# Patient Record
Sex: FEMALE | Race: WHITE | Employment: OTHER | URBAN - NONMETROPOLITAN AREA
[De-identification: names, ages, dates, MRNs, and addresses within clinical notes are randomized per-mention and may not be internally consistent; named-entity substitution may affect disease eponyms.]

---

## 2018-03-07 ENCOUNTER — APPOINTMENT (OUTPATIENT)
Dept: ULTRASOUND IMAGING | Age: 83
DRG: 070 | End: 2018-03-07
Attending: INTERNAL MEDICINE
Payer: MEDICARE

## 2018-03-07 ENCOUNTER — APPOINTMENT (OUTPATIENT)
Dept: INTERVENTIONAL RADIOLOGY/VASCULAR | Age: 83
DRG: 070 | End: 2018-03-07
Attending: INTERNAL MEDICINE
Payer: MEDICARE

## 2018-03-07 ENCOUNTER — HOSPITAL ENCOUNTER (INPATIENT)
Age: 83
LOS: 3 days | Discharge: NURSING, REHAB OR LONG-TERM CARE | DRG: 070 | End: 2018-03-10
Attending: INTERNAL MEDICINE | Admitting: INTERNAL MEDICINE
Payer: MEDICARE

## 2018-03-07 ENCOUNTER — APPOINTMENT (OUTPATIENT)
Dept: MRI IMAGING | Age: 83
DRG: 070 | End: 2018-03-07
Attending: INTERNAL MEDICINE
Payer: MEDICARE

## 2018-03-07 PROBLEM — R74.01 TRANSAMINITIS: Status: ACTIVE | Noted: 2018-03-07

## 2018-03-07 PROBLEM — E86.0 DEHYDRATION: Status: ACTIVE | Noted: 2018-03-07

## 2018-03-07 PROBLEM — G93.40 ACUTE ENCEPHALOPATHY: Status: ACTIVE | Noted: 2018-03-07

## 2018-03-07 LAB
ALBUMIN SERPL-MCNC: 3 G/DL (ref 3.5–5.1)
ALP BLD-CCNC: 215 U/L (ref 38–126)
ALT SERPL-CCNC: 506 U/L (ref 11–66)
ANION GAP SERPL CALCULATED.3IONS-SCNC: 14 MEQ/L (ref 8–16)
ANISOCYTOSIS: ABNORMAL
AST SERPL-CCNC: 406 U/L (ref 5–40)
BASOPHILS # BLD: 0.1 %
BASOPHILS ABSOLUTE: 0 THOU/MM3 (ref 0–0.1)
BILIRUB SERPL-MCNC: 2 MG/DL (ref 0.3–1.2)
BUN BLDV-MCNC: 21 MG/DL (ref 7–22)
CALCIUM SERPL-MCNC: 8.4 MG/DL (ref 8.5–10.5)
CHLORIDE BLD-SCNC: 107 MEQ/L (ref 98–111)
CO2: 21 MEQ/L (ref 23–33)
CREAT SERPL-MCNC: 0.7 MG/DL (ref 0.4–1.2)
EOSINOPHIL # BLD: 0.2 %
EOSINOPHILS ABSOLUTE: 0 THOU/MM3 (ref 0–0.4)
GFR SERPL CREATININE-BSD FRML MDRD: 78 ML/MIN/1.73M2
GLUCOSE BLD-MCNC: 98 MG/DL (ref 70–108)
HCT VFR BLD CALC: 34.4 % (ref 37–47)
HEMOGLOBIN: 11.2 GM/DL (ref 12–16)
LYMPHOCYTES # BLD: 6.7 %
LYMPHOCYTES ABSOLUTE: 0.6 THOU/MM3 (ref 1–4.8)
MAGNESIUM: 2.2 MG/DL (ref 1.6–2.4)
MCH RBC QN AUTO: 29.1 PG (ref 27–31)
MCHC RBC AUTO-ENTMCNC: 32.5 GM/DL (ref 33–37)
MCV RBC AUTO: 89.4 FL (ref 81–99)
MONOCYTES # BLD: 7.1 %
MONOCYTES ABSOLUTE: 0.6 THOU/MM3 (ref 0.4–1.3)
NUCLEATED RED BLOOD CELLS: 0 /100 WBC
PDW BLD-RTO: 16 % (ref 11.5–14.5)
PLATELET # BLD: 131 THOU/MM3 (ref 130–400)
PMV BLD AUTO: 8.9 FL (ref 7.4–10.4)
POTASSIUM SERPL-SCNC: 3.9 MEQ/L (ref 3.5–5.2)
RBC # BLD: 3.85 MILL/MM3 (ref 4.2–5.4)
SEG NEUTROPHILS: 85.9 %
SEGMENTED NEUTROPHILS ABSOLUTE COUNT: 7.3 THOU/MM3 (ref 1.8–7.7)
SODIUM BLD-SCNC: 142 MEQ/L (ref 135–145)
TOTAL CK: 251 U/L (ref 30–135)
TOTAL PROTEIN: 5 G/DL (ref 6.1–8)
WBC # BLD: 8.5 THOU/MM3 (ref 4.8–10.8)

## 2018-03-07 PROCEDURE — 2580000003 HC RX 258: Performed by: INTERNAL MEDICINE

## 2018-03-07 PROCEDURE — 36415 COLL VENOUS BLD VENIPUNCTURE: CPT

## 2018-03-07 PROCEDURE — 85025 COMPLETE CBC W/AUTO DIFF WBC: CPT

## 2018-03-07 PROCEDURE — 70547 MR ANGIOGRAPHY NECK W/O DYE: CPT

## 2018-03-07 PROCEDURE — 93880 EXTRACRANIAL BILAT STUDY: CPT

## 2018-03-07 PROCEDURE — 93970 EXTREMITY STUDY: CPT

## 2018-03-07 PROCEDURE — 80053 COMPREHEN METABOLIC PANEL: CPT

## 2018-03-07 PROCEDURE — 76705 ECHO EXAM OF ABDOMEN: CPT

## 2018-03-07 PROCEDURE — 70551 MRI BRAIN STEM W/O DYE: CPT

## 2018-03-07 PROCEDURE — 70544 MR ANGIOGRAPHY HEAD W/O DYE: CPT

## 2018-03-07 PROCEDURE — 2140000000 HC CCU INTERMEDIATE R&B

## 2018-03-07 PROCEDURE — 99223 1ST HOSP IP/OBS HIGH 75: CPT | Performed by: INTERNAL MEDICINE

## 2018-03-07 PROCEDURE — 83735 ASSAY OF MAGNESIUM: CPT

## 2018-03-07 PROCEDURE — 82550 ASSAY OF CK (CPK): CPT

## 2018-03-07 RX ORDER — RANITIDINE 150 MG/1
150 TABLET ORAL 2 TIMES DAILY
COMMUNITY

## 2018-03-07 RX ORDER — ASPIRIN 81 MG/1
81 TABLET ORAL DAILY
COMMUNITY

## 2018-03-07 RX ORDER — FEXOFENADINE HYDROCHLORIDE 60 MG/1
60 TABLET, FILM COATED ORAL DAILY
COMMUNITY

## 2018-03-07 RX ORDER — SODIUM CHLORIDE 0.9 % (FLUSH) 0.9 %
10 SYRINGE (ML) INJECTION PRN
Status: DISCONTINUED | OUTPATIENT
Start: 2018-03-07 | End: 2018-03-10 | Stop reason: HOSPADM

## 2018-03-07 RX ORDER — HYDROCORTISONE ACETATE 0.5 %
1 CREAM (GRAM) TOPICAL DAILY
COMMUNITY

## 2018-03-07 RX ORDER — SODIUM CHLORIDE 0.9 % (FLUSH) 0.9 %
10 SYRINGE (ML) INJECTION EVERY 12 HOURS SCHEDULED
Status: DISCONTINUED | OUTPATIENT
Start: 2018-03-07 | End: 2018-03-10 | Stop reason: HOSPADM

## 2018-03-07 RX ORDER — CLOTRIMAZOLE AND BETAMETHASONE DIPROPIONATE 10; .64 MG/G; MG/G
CREAM TOPICAL 2 TIMES DAILY
COMMUNITY

## 2018-03-07 RX ORDER — ONDANSETRON 2 MG/ML
4 INJECTION INTRAMUSCULAR; INTRAVENOUS EVERY 6 HOURS PRN
Status: DISCONTINUED | OUTPATIENT
Start: 2018-03-07 | End: 2018-03-10 | Stop reason: HOSPADM

## 2018-03-07 RX ORDER — OMEPRAZOLE 20 MG/1
20 CAPSULE, DELAYED RELEASE ORAL 2 TIMES DAILY
COMMUNITY

## 2018-03-07 RX ORDER — ACETAMINOPHEN 325 MG/1
650 TABLET ORAL EVERY 4 HOURS PRN
Status: DISCONTINUED | OUTPATIENT
Start: 2018-03-07 | End: 2018-03-07

## 2018-03-07 RX ORDER — HYDROCODONE BITARTRATE AND ACETAMINOPHEN 5; 325 MG/1; MG/1
1 TABLET ORAL EVERY 6 HOURS PRN
COMMUNITY

## 2018-03-07 RX ORDER — M-VIT,TX,IRON,MINS/CALC/FOLIC 27MG-0.4MG
1 TABLET ORAL DAILY
COMMUNITY

## 2018-03-07 RX ORDER — FLUTICASONE PROPIONATE 50 MCG
1 SPRAY, SUSPENSION (ML) NASAL DAILY PRN
COMMUNITY

## 2018-03-07 RX ORDER — AMITRIPTYLINE HYDROCHLORIDE 10 MG/1
10 TABLET, FILM COATED ORAL NIGHTLY
COMMUNITY

## 2018-03-07 RX ORDER — ACETAMINOPHEN 325 MG/1
650 TABLET ORAL EVERY 4 HOURS PRN
COMMUNITY

## 2018-03-07 RX ORDER — CYCLOBENZAPRINE HCL 10 MG
10 TABLET ORAL EVERY 8 HOURS PRN
COMMUNITY

## 2018-03-07 RX ORDER — CLONAZEPAM 0.5 MG/1
0.25 TABLET ORAL NIGHTLY
COMMUNITY

## 2018-03-07 RX ORDER — FUROSEMIDE 20 MG/1
10 TABLET ORAL DAILY
COMMUNITY

## 2018-03-07 RX ORDER — DOCUSATE SODIUM 100 MG/1
100 CAPSULE, LIQUID FILLED ORAL DAILY PRN
COMMUNITY

## 2018-03-07 RX ORDER — DEXTROSE AND SODIUM CHLORIDE 5; .45 G/100ML; G/100ML
INJECTION, SOLUTION INTRAVENOUS CONTINUOUS
Status: DISCONTINUED | OUTPATIENT
Start: 2018-03-07 | End: 2018-03-10

## 2018-03-07 RX ADMIN — DEXTROSE AND SODIUM CHLORIDE: 5; 450 INJECTION, SOLUTION INTRAVENOUS at 22:55

## 2018-03-08 PROBLEM — R55 SYNCOPE AND COLLAPSE: Status: ACTIVE | Noted: 2018-03-08

## 2018-03-08 LAB
ALBUMIN SERPL-MCNC: 2.8 G/DL (ref 3.5–5.1)
ALP BLD-CCNC: 191 U/L (ref 38–126)
ALT SERPL-CCNC: 389 U/L (ref 11–66)
AMMONIA: 49 UMOL/L (ref 11–60)
ANION GAP SERPL CALCULATED.3IONS-SCNC: 12 MEQ/L (ref 8–16)
AST SERPL-CCNC: 258 U/L (ref 5–40)
BASE EXCESS (CALCULATED): -2.1 MMOL/L (ref -2.5–2.5)
BILIRUB SERPL-MCNC: 1.3 MG/DL (ref 0.3–1.2)
BUN BLDV-MCNC: 22 MG/DL (ref 7–22)
CALCIUM SERPL-MCNC: 8.4 MG/DL (ref 8.5–10.5)
CHLORIDE BLD-SCNC: 107 MEQ/L (ref 98–111)
CO2: 22 MEQ/L (ref 23–33)
COLLECTED BY:: NORMAL
CREAT SERPL-MCNC: 0.7 MG/DL (ref 0.4–1.2)
DEVICE: NORMAL
FOLATE: > 20 NG/ML (ref 4.8–24.2)
GFR SERPL CREATININE-BSD FRML MDRD: 78 ML/MIN/1.73M2
GLUCOSE BLD-MCNC: 134 MG/DL (ref 70–108)
HCO3: 23 MMOL/L (ref 23–28)
HCT VFR BLD CALC: 32 % (ref 37–47)
HEMOGLOBIN: 10.4 GM/DL (ref 12–16)
IFIO2: 4
INR BLD: 1.18 (ref 0.85–1.13)
MCH RBC QN AUTO: 29.6 PG (ref 27–31)
MCHC RBC AUTO-ENTMCNC: 32.5 GM/DL (ref 33–37)
MCV RBC AUTO: 91.1 FL (ref 81–99)
O2 SATURATION: 96 %
PCO2: 40 MMHG (ref 35–45)
PDW BLD-RTO: 16.4 % (ref 11.5–14.5)
PH BLOOD GAS: 7.37 (ref 7.35–7.45)
PLATELET # BLD: 111 THOU/MM3 (ref 130–400)
PMV BLD AUTO: 9.1 FL (ref 7.4–10.4)
PO2: 86 MMHG (ref 71–104)
POTASSIUM REFLEX MAGNESIUM: 3.6 MEQ/L (ref 3.5–5.2)
RBC # BLD: 3.52 MILL/MM3 (ref 4.2–5.4)
SODIUM BLD-SCNC: 141 MEQ/L (ref 135–145)
SOURCE, BLOOD GAS: NORMAL
TOTAL CK: 163 U/L (ref 30–135)
TOTAL PROTEIN: 4.8 G/DL (ref 6.1–8)
VITAMIN B-12: > 2000 PG/ML (ref 211–911)
WBC # BLD: 6.9 THOU/MM3 (ref 4.8–10.8)

## 2018-03-08 PROCEDURE — 2140000000 HC CCU INTERMEDIATE R&B

## 2018-03-08 PROCEDURE — 95819 EEG AWAKE AND ASLEEP: CPT

## 2018-03-08 PROCEDURE — 82607 VITAMIN B-12: CPT

## 2018-03-08 PROCEDURE — 36600 WITHDRAWAL OF ARTERIAL BLOOD: CPT

## 2018-03-08 PROCEDURE — 36415 COLL VENOUS BLD VENIPUNCTURE: CPT

## 2018-03-08 PROCEDURE — 82550 ASSAY OF CK (CPK): CPT

## 2018-03-08 PROCEDURE — 92610 EVALUATE SWALLOWING FUNCTION: CPT

## 2018-03-08 PROCEDURE — G8979 MOBILITY GOAL STATUS: HCPCS

## 2018-03-08 PROCEDURE — 2500000003 HC RX 250 WO HCPCS: Performed by: INTERNAL MEDICINE

## 2018-03-08 PROCEDURE — G8978 MOBILITY CURRENT STATUS: HCPCS

## 2018-03-08 PROCEDURE — 6370000000 HC RX 637 (ALT 250 FOR IP): Performed by: INTERNAL MEDICINE

## 2018-03-08 PROCEDURE — 2580000003 HC RX 258: Performed by: INTERNAL MEDICINE

## 2018-03-08 PROCEDURE — 6360000002 HC RX W HCPCS: Performed by: HOSPITALIST

## 2018-03-08 PROCEDURE — 85027 COMPLETE CBC AUTOMATED: CPT

## 2018-03-08 PROCEDURE — 80053 COMPREHEN METABOLIC PANEL: CPT

## 2018-03-08 PROCEDURE — 99222 1ST HOSP IP/OBS MODERATE 55: CPT | Performed by: PSYCHIATRY & NEUROLOGY

## 2018-03-08 PROCEDURE — 82746 ASSAY OF FOLIC ACID SERUM: CPT

## 2018-03-08 PROCEDURE — 97162 PT EVAL MOD COMPLEX 30 MIN: CPT

## 2018-03-08 PROCEDURE — 97110 THERAPEUTIC EXERCISES: CPT

## 2018-03-08 PROCEDURE — 82803 BLOOD GASES ANY COMBINATION: CPT

## 2018-03-08 PROCEDURE — 6360000002 HC RX W HCPCS: Performed by: INTERNAL MEDICINE

## 2018-03-08 PROCEDURE — 87086 URINE CULTURE/COLONY COUNT: CPT

## 2018-03-08 PROCEDURE — 2700000000 HC OXYGEN THERAPY PER DAY

## 2018-03-08 PROCEDURE — 97530 THERAPEUTIC ACTIVITIES: CPT

## 2018-03-08 PROCEDURE — 87040 BLOOD CULTURE FOR BACTERIA: CPT

## 2018-03-08 PROCEDURE — 99232 SBSQ HOSP IP/OBS MODERATE 35: CPT | Performed by: HOSPITALIST

## 2018-03-08 PROCEDURE — 82140 ASSAY OF AMMONIA: CPT

## 2018-03-08 PROCEDURE — 85610 PROTHROMBIN TIME: CPT

## 2018-03-08 RX ORDER — HEPARIN SODIUM 5000 [USP'U]/ML
5000 INJECTION, SOLUTION INTRAVENOUS; SUBCUTANEOUS 2 TIMES DAILY
Status: DISCONTINUED | OUTPATIENT
Start: 2018-03-08 | End: 2018-03-10 | Stop reason: HOSPADM

## 2018-03-08 RX ORDER — BUSPIRONE HYDROCHLORIDE 5 MG/1
5 TABLET ORAL 3 TIMES DAILY
Status: DISCONTINUED | OUTPATIENT
Start: 2018-03-08 | End: 2018-03-10 | Stop reason: HOSPADM

## 2018-03-08 RX ORDER — LACTULOSE 10 G/15ML
20 SOLUTION ORAL 2 TIMES DAILY
Status: DISCONTINUED | OUTPATIENT
Start: 2018-03-08 | End: 2018-03-10

## 2018-03-08 RX ADMIN — DEXTROSE AND SODIUM CHLORIDE: 5; 450 INJECTION, SOLUTION INTRAVENOUS at 11:06

## 2018-03-08 RX ADMIN — LACTULOSE 20 G: 20 SOLUTION ORAL at 16:58

## 2018-03-08 RX ADMIN — Medication 10 ML: at 21:03

## 2018-03-08 RX ADMIN — PIPERACILLIN SODIUM,TAZOBACTAM SODIUM 3.38 G: 3; .375 INJECTION, POWDER, FOR SOLUTION INTRAVENOUS at 11:06

## 2018-03-08 RX ADMIN — HEPARIN SODIUM 5000 UNITS: 5000 INJECTION, SOLUTION INTRAVENOUS; SUBCUTANEOUS at 13:18

## 2018-03-08 RX ADMIN — PIPERACILLIN SODIUM,TAZOBACTAM SODIUM 3.38 G: 3; .375 INJECTION, POWDER, FOR SOLUTION INTRAVENOUS at 16:58

## 2018-03-08 RX ADMIN — FOLIC ACID: 5 INJECTION, SOLUTION INTRAMUSCULAR; INTRAVENOUS; SUBCUTANEOUS at 22:26

## 2018-03-08 RX ADMIN — PIPERACILLIN SODIUM,TAZOBACTAM SODIUM 3.38 G: 3; .375 INJECTION, POWDER, FOR SOLUTION INTRAVENOUS at 00:54

## 2018-03-08 RX ADMIN — HEPARIN SODIUM 5000 UNITS: 5000 INJECTION, SOLUTION INTRAVENOUS; SUBCUTANEOUS at 21:02

## 2018-03-08 NOTE — CARE COORDINATION
DISCHARGE BARRIERS  3/8/18, 12:08 PM    Reason for Referral: please assess for discharge needs, currently lives in assisted living. Mental Status: Patient was sleeping on and off, SW spoke with her daughter Dulce Richards. Decision Making: Daughters Paulina and Erika Simon are making decisions at this time. Family/Social/Home Environment: Pam Macdonald was a resident of [address] [place] [address]. Daughter told JUAN C that Pam Macdonald was very independent at the facility. She had help with medications and with bathing. She used her rollator walker to get to all of her meals. Current Services: [facility]  Current Equipment:4 wheeled walker, shower bench, high toilet seat  Payment Source:Medicare and [employer]  Concerns or Barriers to Discharge: None at this time. Collabrative List of ECF/HH were provided:NA    Teach Back Method used with Carmen's daughter Dulce Richards regarding care plan and discharge planning. Patient's daughter Dulce Richards verbalized understanding of the plan of care and contribute to goal setting. Anticipated Needs/Discharge Plan: Dulce Richards would like Pam Macdonald to go to the [facility] of [facility]. SW called and left a message Danielle Engle at [employer].      Electronically signed by Leonardo Amezcua.  RAFI Izaguirre on 3/8/2018 at 12:08 PM

## 2018-03-08 NOTE — H&P
History & Physical        Patient:  Yair Garcia  YOB: 1922    MRN: [id number]     Acct: [de-identified]    PCP: Rasheed Valadez    Date of Admission: 3/7/2018    Date of Service: Pt seen/examined on  3/7/18 and Admitted to Inpatient with expected LOS greater than two midnights due to medical therapy. Chief Complaint:  ALOC      History Of Present Illness:    80 y.o. female who presented to [address] [place] [address] [facility] The patient presented to the Er after she was found with her face down in the floor at the [facility] living with  diane down time. and since then,she remained less verbal,confused and aphasic. Head Ct and abd Cts were normal;her LFTs were abn--obs like process. No fever,n/v/d or sob,leg swelling ,headache,neck pain or back pain was reported,  The patient was treated empirically with iv fluids and antibiotics. Imaging studies were normal.          Past Medical History:          Diagnosis Date    Anxiety     Cellulitis     Depression     GERD (gastroesophageal reflux disease)     Heart murmur     Hyperlipidemia     Hypertension     Pulmonary emboli [facility])        Past Surgical History:          Procedure Laterality Date    COLONOSCOPY  2006    COLONOSCOPY  8-30-12    Dr. Sanya Gomez       Medications Prior to Admission:      Prior to Admission medications    Medication Sig Start Date End Date Taking?  Authorizing Provider   aspirin 81 MG EC tablet Take 81 mg by mouth daily   Yes Historical Provider, MD   fexofenadine (ALLEGRA) 60 MG tablet Take 60 mg by mouth daily   Yes Historical Provider, MD   furosemide (LASIX) 20 MG tablet Take 10 mg by mouth daily   Yes Historical Provider, MD   Glucosamine-Chondroit-Vit C-Mn (GLUCOSAMINE 1500 COMPLEX) CAPS Take 1 capsule by mouth daily   Yes Historical Provider, MD   Multiple Vitamins-Minerals (THERAPEUTIC MULTIVITAMIN-MINERALS) tablet Take 1 tablet by mouth daily   Yes Historical Provider, MD   clotrimazole-betamethasone

## 2018-03-08 NOTE — PROGRESS NOTES
[facility] Laboratory Technician Worksheet      EEG Date: 3/8/2018    Name: Twila Orlando   : 1922   Age: 80 y.o. SEX: female    ROOM: [facility] MRN: [id number]           CSN: [id number]    Ordering Provider: Wale Brain  EEG Number: [id number] Time of Test:  1188    Hand: -   Sedation: no    H.V. Done: No not attempted Photic: Yes    Sleep: No  Drowsy: No   Sleep Deprived: No    Seizures observed: no    Technician Impression:2    Mentality: some expressive aphasia    Clinical History:  DX:  Syncope   Pt found face down at assisted.  living facility   Some aphasia, but improving   [facility]    MRI shows  No evidence of acute infarct, chronic small vessel ischemic disease, etc    Past Medical History:       Diagnosis Date    Anxiety     Cellulitis     Depression     GERD (gastroesophageal reflux disease)     Heart murmur     Hyperlipidemia     Hypertension     Pulmonary emboli (HCC)        Scheduled Meds:   sodium chloride flush  10 mL Intravenous 2 times per day    piperacillin-tazobactam  3.375 g Intravenous Q8H     Continuous Infusions:   dextrose 5 % and 0.45 % NaCl 75 mL/hr at 18 2255     PRN Meds:.sodium chloride flush, ondansetron    Technician: Dipti Acosta 3/8/2018

## 2018-03-08 NOTE — PROGRESS NOTES
Progress Note    Patient:  Ruddy Mcgee    Unit/Bed:3B-26/026-A  YOB: 1922  MRN: [id number]   Acct: [de-identified]   Admit date: 3/7/2018      Principal Problem:    Acute encephalopathy  Active Problems:    HTN (hypertension)    Transaminitis    Dehydration    Syncope and collapse  Resolved Problems:    * No resolved hospital problems. *        Assessment and Plan:  1. Acute metabolic encephalopathy: neuro imaging is normal, EEG pending  2. Gram negative bacteremia: positive blood cultures from [facility]. Continue with IV Zosyn and monitor cultures. 3. Weakness: PT/OT  4. Acute hypoxemic respiratory failure: wean O2, PRN bronchodilators   5. Advanced care planning:  Discussed code status with family, they are reconsidering full code decision. Palliative care to see  6. VTE prophylaxis: Heparin        Patient Seen, Chart, Consults notes, Labs, Radiology studies reviewed. Subjective: Day 1 of stay with acute metabolic encephalopathy found unconscious on the floor of Assisted Living  Patient seen and examined at bedside, awake but mumbles. Daughters at bedside, no acute overnight events    All other ROS negative except noted in HPI    Past, Family, Social History unchanged from admission.     Diet:  Diet NPO Effective Now    Medications:  Scheduled Meds:   sodium chloride flush  10 mL Intravenous 2 times per day    piperacillin-tazobactam  3.375 g Intravenous Q8H     Continuous Infusions:   dextrose 5 % and 0.45 % NaCl 75 mL/hr at 03/07/18 2255     PRN Meds:sodium chloride flush, ondansetron    Objective:  CBC:   Recent Labs      03/07/18 1947 03/08/18   0403   WBC  8.5  6.9   HGB  11.2*  10.4*   PLT  131  111*     BMP:    Recent Labs      03/07/18 1947 03/08/18   0403   NA  142  141   K  3.9  3.6   CL  107  107   CO2  21*  22*   BUN  21  22   CREATININE  0.7  0.7   GLUCOSE  98  134*     Calcium:  Recent Labs      03/08/18   0403   CALCIUM  8.4*     Ionized Calcium:No results less than 125 cm/second and plaque or intimal thickening is visible. Moderate - 50-69% stenosis. ICA PSV is 125 to 230 cm/second and plaque is visible. Severe - 70-94% stenosis. ICA PSV is more than 230 cm/second and visible plaque with lumen narrowing is seen. Near occlusion - 95-99% stenosis. ICA PSV is variable and significant plaque with luminal narrowing is seen. Occluded - 100% stenosis. No flow identified. **This report has been created using voice recognition software. It may contain minor errors which are inherent in voice recognition technology. ** Final report electronically signed by Dr. Valley Klinefelter on 3/8/2018 1:26 AM    Vl Dup Lower Extremity Venous Bilateral    Result Date: 3/8/2018  PROCEDURE: VL DUP LOWER EXTREMITY VENOUS BILATERAL CLINICAL INFORMATION: leg swelling, . COMPARISON: No prior study. TECHNIQUE: Venous doppler ultrasound was performed of the bilateral lower extremities using gray scale, color flow and spectral doppler imaging. FINDINGS: Right leg: Deep veins (common femoral, femoral, popliteal, and calf veins): Patent and compressible, with spontaneous flow, phasicity, and satisfactory augmentation. Superficial veins (greater saphenous vein): The imaged portions of the saphenous vein are patent and compressible, with spontaneous flow, phasicity, and satisfactory augmentation. Left leg: Deep veins (common femoral, femoral, popliteal, and calf veins): Patent and compressible, with spontaneous flow, phasicity, and satisfactory augmentation. Superficial veins (greater saphenous vein): The imaged portions of the saphenous vein are patent and compressible, with spontaneous flow, phasicity, and satisfactory augmentation. Baker's cyst: None     No evidence of bilateral lower extremity DVT. **This report has been created using voice recognition software. It may contain minor errors which are inherent in voice recognition technology. ** Final report electronically signed by Dr. Volodymyr Beckwith

## 2018-03-08 NOTE — PROGRESS NOTES
[facility]  INPATIENT PHYSICAL THERAPY  EVALUATION  [facility] - 3B-26/026-A    Time In:   Time Out:   Timed Code Treatment Minutes: 24 Minutes  Minutes: 39    Date: 3/8/2018  Patient Name: Kaya Cooper,  Gender:  female        MRN: [id number]  : 1922  (89 y.o.)      Referring Practitioner: Sami Edmondson MD  Diagnosis: acute encephalopathy   Additional Pertinent Hx: Patient is a 80year old female who presented to from [facility] on 3/7/18 after being found down at [facility]. Patient was found to have expressive aphasia and sent on to [facility]. 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 MRA of brain and neck was normal and negative for stenosis. Doppler of LEs negative for DVTs. Patient has a history of Anxiety, Depression, GERD, HLD, HTN, pulmonary emboli. Past Medical History:   Diagnosis Date    Anxiety     Cellulitis     Depression     GERD (gastroesophageal reflux disease)     Heart murmur     Hyperlipidemia     Hypertension     Pulmonary emboli [facility])      Past Surgical History:   Procedure Laterality Date    COLONOSCOPY      COLONOSCOPY  12    Dr. Barnet Canavan       Restrictions/Precautions:  General Precautions, Fall Risk     Subjective:  Chart Reviewed: Yes  Patient assessed for rehabilitation services?: Yes  Family / Caregiver Present: Yes (daughter)  Other (Comment): Patient extremely fearful throughout session. Required constant hand holding from daughter and cues for motivation. Patient would follow commands but only after tactile cues to initate movement. Subjective: RN approved session. Patient very fearful and anxious throughout session, stating she didn't want to \"go to sleep. \" Patient is very fearful of dying and becomes increasingly anxious when therapist moves or steps out of room. Daughter had to continuously reassure patient. Patient is oriented to self. General:  Overall Orientation Status: Impaired  Orientation Level: Oriented to person, Disoriented to place, Disoriented to time, Disoriented to situation  Follows Commands: Impaired    Vision: Impaired  Vision Exceptions:  (patient was stating she couldn't see therapist when on L side or farther away. May be due to fear. Patient continued to lay on R side to face daughter throughout session)    Hearing: Within functional limits    Pain:  Denies. Social/Functional History:    Type of Home: Facility  Home Layout: One level  Home Access: Level entry  Home Equipment: 4 wheeled walker     Receives Help From: Family  ADL Assistance: Independent  Homemaking Assistance: Needs assistance  Ambulation Assistance: Independent  Transfer Assistance: Independent    Active : No  Additional Comments: All social background given by daughter: Patient has lived at [facility] for ~6 years. Patient is I with all mobility with rollator. States the facility helps with housekeeping, medications and bathing. Patient is I with all other ADLs. No baseline confusion or dementia. Patient is able to walk around building with rollator. Objective:  RLE PROM: WFL    LLE PROM: WFL    Strength RLE: Exception  Comment: Hip Flexion 2+/5, Knee Extension 3+/5, Ankle Dorsiflexion 3+/5    Strength LLE: Exception  Comment: Hip Flexion 2+/5, Knee Extension 3+/5, Ankle Dorsiflexion 3+/5    Balance  Posture: Good  Sitting - Static: Good  Sitting - Dynamic: Good  Standing - Static: Fair  Standing - Dynamic: Poor, +  Comments: Patient sat EOB ~8 minutes from MINx1 to CGA once anxiety over mobility decreased. Patient ambulated 4 steps to chair with MODx1 at lumbar spine required to maintain upright and tuck hips. Patient's increased fear towards all mobility limited dynamic balance. Supine to Sit: Moderate assistance (MODx1, patient very fearful towards all mobility. VCs for participation and motivation.  Patient able to place LEs on side of bed, MODx1 for upper body placement)  Scooting: Moderate assistance (advancing hips towards EOB. Patient stated she could not perform task due to fear)    Transfers  Sit to Stand: Moderate Assistance (to RW, motivation and safety cues given. Rocking utilized to gain momentum. MODx1 to stand and tactile cues at lumbar spine for upright posture. )  Stand to sit: Minimal Assistance (MINx1 for controlle ddescent ot bedside chair. Patient hesistant towards sitting due to fear of falling)     Ambulation 1  Device: Rolling Walker  Assistance: Moderate assistance  Quality of Gait: short, shuffled steps bilaterally, MODx1 at lumbar spine to maintain forward hip posture and decrease fear of faling backwards. Constant verbal cueing given for stepping due to fear  Distance: 4 steps to chair  Comments: Patient has increased fear of falling due to recent fall. Patient able to take steps but required constant verbal cueing for motivation. Patient able to complete ambulation once reassured she would not fall. Exercises:  Comments: supine in bed AAROM: ankle pumps, heel slides, hip abduction x10 bilaterally for improved LE strength for functional mobility. Patient with increased fear during exercises and requires additional time for all exercises. Activity Tolerance:  Activity Tolerance: Patient limited by cognitive status  Activity Tolerance: Patient had increased fear and anxiety throughout session which limited all mobility     Treatment Initiated: see above exercises, sitting EOB ~8 minutes at varying levels of assistance to maintain upright and decreasing fear towards mobility   Assessment: Body structures, Functions, Activity limitations: Decreased functional mobility , Decreased ADL status, Decreased ROM, Decreased strength, Decreased safe awareness, Decreased cognition, Decreased endurance, Decreased balance  Assessment: Patient tolerated session fairly.  Patient had increased fear and anxiety towards

## 2018-03-08 NOTE — PROGRESS NOTES
[address] [address]  SPEECH THERAPY  [facility] CCU-STEPDOWN 3B  Bedside Swallowing Evaluation      SLP Individual Minutes  Time In: 0900  Time Out: 5375  Minutes: 10  Timed Code Treatment Minutes: 0 Minutes       Date: 3/8/2018  Patient Name: Prosper Deleon      CSN: [id number]   : 1922  (89 y.o.)  Gender: female   Referring Physician:  Abraham Litten, MD  Diagnosis: Acute encephalopathy  Secondary Diagnosis:  Dysphagia  History of Present Illness/Injury: Pt presents with the above diagnosis. See H&P for full details.  ST consulted to assess swallow function due to NPO diet status secondary to increased confusion s/p fall; imaging studies all normal.   Past Medical History:   Diagnosis Date    Anxiety     Cellulitis     Depression     GERD (gastroesophageal reflux disease)     Heart murmur     Hyperlipidemia     Hypertension     Pulmonary emboli (HCC)        Pain:  Pt unable to state, no facial grimaces observed    Current Diet: NPO    Respiratory Status:  [x] Nasal Cannula     Behavioral Observation: [] Alert [] Oriented [x] Confused [] Lethargic      [] Dysarthric [x] Limited Direction Following [] Agitated      [] Other:    ORAL MECHANISM EVALUATION:         Comments: Very limited direction following  Facial / Labial [x]WFL [] Impaired []DNT    Lingual [x]WFL [] Impaired []DNT    Dentition []WFL [] Impaired [x]DNT    Velum []WFL [] Impaired [x]DNT    Vocal Quality []WFL [] Impaired [x]DNT    Sensation []WFL [] Impaired [x]DNT    Cough []WFL [] Impaired [x]DNT    Other: []WFL [] Impaired []DNT    Other: []WFL [] Impaired []DNT        PATIENT WAS EVALUATED USING:  Ice chips    ORAL PHASE: [] WFL  [x] Impaired   [] Loss of bolus from lips [x] Impaired AP movement [] Pocketing Left   [] Pocketing Right  [] Lingual Pumping  [] Impaired Mastication   [] Reduced Bolus Formation [x] Impaired Oral Initiation    [] OTHER:     PHARYNGEAL PHASE: [] WFL: Pharyngeal phase appears WFLs, but cannot evaluation     [] Reviewed diet and strategies     [] Reviewed signs, symptoms and risk of aspiration     [] Demonstrated how to thick liquid appropriately. [] Reviewed goals and Plan of Care     [] OTHER:   Method: [x] Discussion [] Demonstration [] Hand-out     [] OTHER:   Evaluation of Education:     [x] Verbalizes understanding: family [] Demonstrates with assistance     [] Demonstrates without assistance [x]Needs further instruction:pt      [x] No evidence of learning  [] Family not present    PATIENT GOALS: [] Pt did not state. Will further assess during treatment. [x] Return to the least restricted diet possible     [] Return to previous level of function     [] OTHER:    PLAN / TREATMENT RECOMMENDATIONS:  Plan to complete full speech/langauge/cognitive evaluation as appropriate  [x] Skilled SLP intervention on acute care 3-5 x per week or until goals met and/or pt plateaus in function. Specific interventions for next session may include: diet analysis      SHORT TERM GOALS:  Short-term Goals  Timeframe for Short-term Goals: 2 weeks  Goal 1: Pt will safely tolerate trials of ice chips and puree with no s/s aspiration to attempt potential PO diet level. Goal 2: Pt will complete oral and pharyngeal strengthening exercises x10 to improve bolus manipulation and airway protection. Goal 3: Complete full speech/language/cognitvie assessment to add goals per POC as appropriate.           MERCEDES Valderrama, Speech Intern  Emily Mora M.A., [address] [place] [address]

## 2018-03-09 LAB
ALBUMIN SERPL-MCNC: 2.6 G/DL (ref 3.5–5.1)
ALP BLD-CCNC: 172 U/L (ref 38–126)
ALT SERPL-CCNC: 257 U/L (ref 11–66)
ANION GAP SERPL CALCULATED.3IONS-SCNC: 12 MEQ/L (ref 8–16)
ANISOCYTOSIS: ABNORMAL
AST SERPL-CCNC: 96 U/L (ref 5–40)
BASOPHILS # BLD: 0.2 %
BASOPHILS ABSOLUTE: 0 THOU/MM3 (ref 0–0.1)
BILIRUB SERPL-MCNC: 1.1 MG/DL (ref 0.3–1.2)
BILIRUBIN DIRECT: 0.6 MG/DL (ref 0–0.3)
BUN BLDV-MCNC: 14 MG/DL (ref 7–22)
CALCIUM SERPL-MCNC: 8.5 MG/DL (ref 8.5–10.5)
CHLORIDE BLD-SCNC: 107 MEQ/L (ref 98–111)
CO2: 22 MEQ/L (ref 23–33)
CREAT SERPL-MCNC: 0.6 MG/DL (ref 0.4–1.2)
EOSINOPHIL # BLD: 5.2 %
EOSINOPHILS ABSOLUTE: 0.3 THOU/MM3 (ref 0–0.4)
GFR SERPL CREATININE-BSD FRML MDRD: > 90 ML/MIN/1.73M2
GLUCOSE BLD-MCNC: 101 MG/DL (ref 70–108)
HCT VFR BLD CALC: 34.6 % (ref 37–47)
HEMOGLOBIN: 11.4 GM/DL (ref 12–16)
LYMPHOCYTES # BLD: 14.5 %
LYMPHOCYTES ABSOLUTE: 0.9 THOU/MM3 (ref 1–4.8)
MCH RBC QN AUTO: 29.5 PG (ref 27–31)
MCHC RBC AUTO-ENTMCNC: 33 GM/DL (ref 33–37)
MCV RBC AUTO: 89.3 FL (ref 81–99)
MONOCYTES # BLD: 8.5 %
MONOCYTES ABSOLUTE: 0.5 THOU/MM3 (ref 0.4–1.3)
NUCLEATED RED BLOOD CELLS: 0 /100 WBC
PDW BLD-RTO: 15.9 % (ref 11.5–14.5)
PLATELET # BLD: 126 THOU/MM3 (ref 130–400)
PMV BLD AUTO: 9.8 FL (ref 7.4–10.4)
POTASSIUM SERPL-SCNC: 3.4 MEQ/L (ref 3.5–5.2)
RBC # BLD: 3.88 MILL/MM3 (ref 4.2–5.4)
SEG NEUTROPHILS: 71.6 %
SEGMENTED NEUTROPHILS ABSOLUTE COUNT: 4.3 THOU/MM3 (ref 1.8–7.7)
SODIUM BLD-SCNC: 141 MEQ/L (ref 135–145)
TOTAL PROTEIN: 4.6 G/DL (ref 6.1–8)
WBC # BLD: 6 THOU/MM3 (ref 4.8–10.8)

## 2018-03-09 PROCEDURE — 6370000000 HC RX 637 (ALT 250 FOR IP): Performed by: INTERNAL MEDICINE

## 2018-03-09 PROCEDURE — G8987 SELF CARE CURRENT STATUS: HCPCS

## 2018-03-09 PROCEDURE — 2140000000 HC CCU INTERMEDIATE R&B

## 2018-03-09 PROCEDURE — 6370000000 HC RX 637 (ALT 250 FOR IP): Performed by: FAMILY MEDICINE

## 2018-03-09 PROCEDURE — G8988 SELF CARE GOAL STATUS: HCPCS

## 2018-03-09 PROCEDURE — 6370000000 HC RX 637 (ALT 250 FOR IP): Performed by: HOSPITALIST

## 2018-03-09 PROCEDURE — 6360000002 HC RX W HCPCS: Performed by: INTERNAL MEDICINE

## 2018-03-09 PROCEDURE — 80053 COMPREHEN METABOLIC PANEL: CPT

## 2018-03-09 PROCEDURE — 85025 COMPLETE CBC W/AUTO DIFF WBC: CPT

## 2018-03-09 PROCEDURE — 82248 BILIRUBIN DIRECT: CPT

## 2018-03-09 PROCEDURE — 92610 EVALUATE SWALLOWING FUNCTION: CPT

## 2018-03-09 PROCEDURE — 2580000003 HC RX 258: Performed by: INTERNAL MEDICINE

## 2018-03-09 PROCEDURE — 6360000002 HC RX W HCPCS: Performed by: HOSPITALIST

## 2018-03-09 PROCEDURE — 36415 COLL VENOUS BLD VENIPUNCTURE: CPT

## 2018-03-09 PROCEDURE — 97166 OT EVAL MOD COMPLEX 45 MIN: CPT

## 2018-03-09 PROCEDURE — 99232 SBSQ HOSP IP/OBS MODERATE 35: CPT | Performed by: HOSPITALIST

## 2018-03-09 PROCEDURE — 97535 SELF CARE MNGMENT TRAINING: CPT

## 2018-03-09 PROCEDURE — 99231 SBSQ HOSP IP/OBS SF/LOW 25: CPT | Performed by: PSYCHIATRY & NEUROLOGY

## 2018-03-09 RX ORDER — POTASSIUM CHLORIDE 750 MG/1
40 TABLET, FILM COATED, EXTENDED RELEASE ORAL ONCE
Status: COMPLETED | OUTPATIENT
Start: 2018-03-09 | End: 2018-03-09

## 2018-03-09 RX ORDER — CLONAZEPAM 0.5 MG/1
0.25 TABLET ORAL NIGHTLY
Status: DISCONTINUED | OUTPATIENT
Start: 2018-03-09 | End: 2018-03-10 | Stop reason: HOSPADM

## 2018-03-09 RX ADMIN — Medication 10 ML: at 20:50

## 2018-03-09 RX ADMIN — LACTULOSE 20 G: 20 SOLUTION ORAL at 09:33

## 2018-03-09 RX ADMIN — BUSPIRONE HYDROCHLORIDE 5 MG: 5 TABLET ORAL at 20:36

## 2018-03-09 RX ADMIN — POTASSIUM CHLORIDE 40 MEQ: 750 TABLET, FILM COATED, EXTENDED RELEASE ORAL at 12:25

## 2018-03-09 RX ADMIN — PIPERACILLIN SODIUM,TAZOBACTAM SODIUM 3.38 G: 3; .375 INJECTION, POWDER, FOR SOLUTION INTRAVENOUS at 00:35

## 2018-03-09 RX ADMIN — BUSPIRONE HYDROCHLORIDE 5 MG: 5 TABLET ORAL at 15:22

## 2018-03-09 RX ADMIN — LACTULOSE 20 G: 20 SOLUTION ORAL at 20:37

## 2018-03-09 RX ADMIN — CLONAZEPAM 0.25 MG: 0.5 TABLET ORAL at 20:37

## 2018-03-09 RX ADMIN — Medication 10 ML: at 08:12

## 2018-03-09 RX ADMIN — BUSPIRONE HYDROCHLORIDE 5 MG: 5 TABLET ORAL at 00:35

## 2018-03-09 RX ADMIN — BUSPIRONE HYDROCHLORIDE 5 MG: 5 TABLET ORAL at 09:33

## 2018-03-09 RX ADMIN — HEPARIN SODIUM 5000 UNITS: 5000 INJECTION, SOLUTION INTRAVENOUS; SUBCUTANEOUS at 09:34

## 2018-03-09 RX ADMIN — PIPERACILLIN SODIUM,TAZOBACTAM SODIUM 3.38 G: 3; .375 INJECTION, POWDER, FOR SOLUTION INTRAVENOUS at 15:27

## 2018-03-09 RX ADMIN — HEPARIN SODIUM 5000 UNITS: 5000 INJECTION, SOLUTION INTRAVENOUS; SUBCUTANEOUS at 20:37

## 2018-03-09 RX ADMIN — PIPERACILLIN SODIUM,TAZOBACTAM SODIUM 3.38 G: 3; .375 INJECTION, POWDER, FOR SOLUTION INTRAVENOUS at 08:12

## 2018-03-09 NOTE — CARE COORDINATION
3/9/18, 2:53 PM    Discharge plan discussed by  and . Discharge plan reviewed with patient/ family. Patient/ family verbalize understanding of discharge plan and are in agreement with plan. Understanding was demonstrated using the teach back method. IMM Letter  IMM Letter date given[de-identified] 03/08/18  IMM Letter time given[de-identified] 80     Noa Martinez is a potential discharge for the weekend. She will be skilled under her Medicare benefit. She and family are agreeable to [facility] for transport. SW left a message for [place] in admissions at [address] [place] [address] to potential discharge for the weekend.

## 2018-03-09 NOTE — PROCEDURES
[address] [place] [address]                            ELECTROENCEPHALOGRAM REPORT    PATIENT NAME: Jhon Duane               :        1922  MED REC NO:   [id number]                           ROOM:       0026  ACCOUNT NO:   [de-identified]                           ADMIT DATE: 2018  PROVIDER:     Esthela Stallings. Malu Cadena MD    DATE OF EE2018    REFERRING PHYSICIAN:  Dr. Paul Valentin. CLINICAL HISTORY:  This is a 42-year-old female found face down in assisted  living with aphasia, hard of hearing, _____ syncopal episode, evaluate for  possible seizure. MEDICATIONS LISTED:  Piperacillin and tazobactam, sodium chloride,  dextrose. This is a 17-channel EEG performed without sleep deprivation. Hyperventilation was not performed. Photic stimulation was performed. The  patient is described as alert, aphasic. The background rhythm activity is noted to be 7 Hz in posterior parietal  area, symmetric, attenuates with eye opening. Excessive slowing was seen  in theta range suggestive of cortical dysfunction such as seen with  encephalopathies. Hyperventilation was not performed. Photic stimulation  was performed without abnormality. There was no evidence of epileptiform  activity appreciated throughout this recording. IMPRESSION:  This is an abnormal EEG due to the excessive slowing seen in  the theta range, suggestive of cortical dysfunction such as seen with  encephalopathies. However, there was no evidence of epileptiform activity  appreciated.         Sameer Amaro MD    D: 2018 6:19:38       T: 2018 6:21:41     [id number]  Job#: [id number]     Doc#: [id number]    CC:

## 2018-03-09 NOTE — FLOWSHEET NOTE
18   Encounter Summary   Services provided to: Patient and family together   Referral/Consult From: Other    Support System Children   Continue Visiting Yes  (3/8)   Complexity of Encounter High   Length of Encounter 45 minutes   Spiritual Assessment Completed Yes   Crisis   Type Emotional distress   Assessment Tearful;Grieving; Anxious   Intervention Discussed relationship with God;Discussed belief system/Catholic practices/lydia;Discussed death;Prayer;Nurtured hope   Outcome Shared life review;Expressed gratitude;Encouraged   3/8/18  S: Patient explained that she was not doing well. Her brother  recently which was leaving her as the only person in her family of origin. She also felt like she was dying. This staff explained her Nurse indicated that she was doing much better today. O: Daughter was in the room standing next to her mom. She was holding her hand and asked that I come in and pray with them. A: This staff asked many questions of the patient to try and help her gain confidence in her ability to trust in her relationship with God. She stated \"I haven't walked down the aisle to confirm my commitment to God. \" This staff assured her that wasn't a command from God but something man has determined was necessary. We discussed the scriptures for a while and helped her to understand her fears, although very real, were not necessary and that God doesn't want us to be afraid. She held my hand and asked me to hold her, so her daughter and I both reached our arms and held her again as we continued to pray to release her fears. She seemed better so this staff left to get her something to drink as she had asked for some ice chips. P: Daughter felt she would be better if she could rest so this was encouraged. Family will also bring in her [address] for placement in her chart. Reassure the patient of her loving relationship with God to give her comfort in her continued living.

## 2018-03-09 NOTE — CARE COORDINATION
3/9/18, 11:59 AM  Abraham Davila day: 2  Location: [address] [place] [address]- Reason for admit: Acute encephalopathy [G93.40]  Acute encephalopathy [G93.40]     Clinical update: IV Zosyn continues for (+) blood cultures, EEG showed slowing but no seizures, brito, up with walker, pt with improving confusion, /50. PT/OT/ST to see. Discharge plan: Plan is for [facility] at discharge. SS following.

## 2018-03-09 NOTE — PROGRESS NOTES
Progress Note    Patient:  Barrington Guillermos    Unit/Bed:3B-26/026-A  YOB: 1922  MRN: [id number]   Acct: [de-identified]   Admit date: 3/7/2018      Principal Problem:    Acute encephalopathy  Active Problems:    HTN (hypertension)    Transaminitis    Dehydration    Syncope and collapse  Resolved Problems:    * No resolved hospital problems. *        Assessment and Plan:  1. Acute metabolic encephalopathy: neuro imaging is normal, EEG shows slowing, no seizures. 2. Gram negative bacteremia: positive blood cultures from [facility]. Continue with IV Zosyn, will call to get updated culture results  3. Weakness: PT/OT, PT/OT eval  4. Acute hypoxemic respiratory failure: wean O2, PRN bronchodilators   5. Anxiety: okay to restart Klonopin  6. Advanced care planning:  Discussed code status with family, they are reconsidering full code decision. Palliative care to see  7. VTE prophylaxis: Heparin        Patient Seen, Chart, Consults notes, Labs, Radiology studies reviewed. Subjective: Day 2 of stay with acute metabolic encephalopathy found unconscious on the floor of Assisted Living  Patient seen and examined at bedside, more awake today. Daughters at bedside, no acute overnight events     All other ROS negative except noted in HPI    Past, Family, Social History unchanged from admission.     Diet:  Diet NPO Effective Now    Medications:  Scheduled Meds:   clonazePAM  0.25 mg Oral Nightly    heparin (porcine)  5,000 Units Subcutaneous BID    lactulose  20 g Oral BID    busPIRone  5 mg Oral TID    sodium chloride flush  10 mL Intravenous 2 times per day    piperacillin-tazobactam  3.375 g Intravenous Q8H     Continuous Infusions:   dextrose 5 % and 0.45 % NaCl 75 mL/hr at 03/08/18 1106     PRN Meds:sodium chloride flush, ondansetron    Objective:  CBC:   Recent Labs      03/07/18   1947  03/08/18   0403   WBC  8.5  6.9   HGB  11.2*  10.4*   PLT  131  111*     BMP:    Recent Labs Vertebral artery: Flow is antegrade with a peak systolic velocity of 75 cm/sec. No hemodynamically significant stenosis by NASCET criteria in either internal carotid artery. Antegrade vertebral artery blood flow bilaterally. Carotid Stenosis Reference Using SRU Criteria:   Mild - <50% stenosis. ICA PSV is less than 125 cm/second and plaque or intimal thickening is visible. Moderate - 50-69% stenosis. ICA PSV is 125 to 230 cm/second and plaque is visible. Severe - 70-94% stenosis. ICA PSV is more than 230 cm/second and visible plaque with lumen narrowing is seen. Near occlusion - 95-99% stenosis. ICA PSV is variable and significant plaque with luminal narrowing is seen. Occluded - 100% stenosis. No flow identified. **This report has been created using voice recognition software. It may contain minor errors which are inherent in voice recognition technology. ** Final report electronically signed by Dr. Cintia Melendez on 3/8/2018 1:26 AM    Vl Dup Lower Extremity Venous Bilateral    Result Date: 3/8/2018  PROCEDURE: VL DUP LOWER EXTREMITY VENOUS BILATERAL CLINICAL INFORMATION: leg swelling, . COMPARISON: No prior study. TECHNIQUE: Venous doppler ultrasound was performed of the bilateral lower extremities using gray scale, color flow and spectral doppler imaging. FINDINGS: Right leg: Deep veins (common femoral, femoral, popliteal, and calf veins): Patent and compressible, with spontaneous flow, phasicity, and satisfactory augmentation. Superficial veins (greater saphenous vein): The imaged portions of the saphenous vein are patent and compressible, with spontaneous flow, phasicity, and satisfactory augmentation. Left leg: Deep veins (common femoral, femoral, popliteal, and calf veins): Patent and compressible, with spontaneous flow, phasicity, and satisfactory augmentation. Superficial veins (greater saphenous vein):  The imaged portions of the saphenous vein are patent and compressible, with spontaneous flow, phasicity, and satisfactory augmentation. Baker's cyst: None     No evidence of bilateral lower extremity DVT. **This report has been created using voice recognition software. It may contain minor errors which are inherent in voice recognition technology. ** Final report electronically signed by Dr. Kandis Perez on 3/8/2018 1:23 AM    Mra Neck Wo Contrast    Result Date: 3/8/2018  PROCEDURE: MRA NECK WO CONTRAST CLINICAL INFORMATION: cva. Found on the floor. Aphasia. COMPARISON: Brain MRI 3/7/2018. TECHNIQUE: 2-D and 3-D time-of-flight images were obtained through the carotid bifurcations. Multiplanar reconstructions, including MIP images were obtained. FINDINGS: Right: There is no stenosis of the distal common carotid artery. The carotid bulb is normal. There is no stenosis at the origin of the internal carotid artery. Left: There is no stenosis of the distal common carotid artery. The carotid bulb is normal. There is no stenosis at the origin of the internal carotid artery. Vertebral arteries: There is antegrade flow in both vertebral arteries. The vertebral arteries are codominant. No stenosis is noted. No stenosis of either carotid bulb. **This report has been created using voice recognition software. It may contain minor errors which are inherent in voice recognition technology. ** Final report electronically signed by Dr. Segun Sanchez on 3/8/2018 7:43 AM    Mri Brain Wo Contrast    Result Date: 3/8/2018  PROCEDURE: MRI BRAIN WO CONTRAST CLINICAL INFORMATION aloc/cva. [employer]. Found on floor. Aphasia COMPARISON: No prior study. TECHNIQUE: Multiplanar and multiple spin echo MRI images were obtained of the brain without contrast. FINDINGS: Motion artifact does degrade the quality of some of these images. These are the best images possible at this time.  The diffusion-weighted images are normal.  The brain volume is moderately reduced There is a moderate amount of abnormal signal in the white matter of the

## 2018-03-10 VITALS
RESPIRATION RATE: 16 BRPM | SYSTOLIC BLOOD PRESSURE: 134 MMHG | HEART RATE: 95 BPM | BODY MASS INDEX: 21.18 KG/M2 | OXYGEN SATURATION: 96 % | TEMPERATURE: 98.1 F | WEIGHT: 123.4 LBS | DIASTOLIC BLOOD PRESSURE: 60 MMHG

## 2018-03-10 LAB
ALBUMIN SERPL-MCNC: 2.8 G/DL (ref 3.5–5.1)
ALP BLD-CCNC: 175 U/L (ref 38–126)
ALT SERPL-CCNC: 183 U/L (ref 11–66)
ANION GAP SERPL CALCULATED.3IONS-SCNC: 13 MEQ/L (ref 8–16)
ANISOCYTOSIS: ABNORMAL
AST SERPL-CCNC: 55 U/L (ref 5–40)
BASOPHILS # BLD: 0.4 %
BASOPHILS ABSOLUTE: 0 THOU/MM3 (ref 0–0.1)
BILIRUB SERPL-MCNC: 1 MG/DL (ref 0.3–1.2)
BUN BLDV-MCNC: 9 MG/DL (ref 7–22)
CALCIUM SERPL-MCNC: 8.6 MG/DL (ref 8.5–10.5)
CHLORIDE BLD-SCNC: 112 MEQ/L (ref 98–111)
CO2: 19 MEQ/L (ref 23–33)
CREAT SERPL-MCNC: 0.6 MG/DL (ref 0.4–1.2)
EOSINOPHIL # BLD: 4 %
EOSINOPHILS ABSOLUTE: 0.3 THOU/MM3 (ref 0–0.4)
GFR SERPL CREATININE-BSD FRML MDRD: > 90 ML/MIN/1.73M2
GLUCOSE BLD-MCNC: 137 MG/DL (ref 70–108)
HCT VFR BLD CALC: 34.2 % (ref 37–47)
HEMOGLOBIN: 11.4 GM/DL (ref 12–16)
LYMPHOCYTES # BLD: 18 %
LYMPHOCYTES ABSOLUTE: 1.1 THOU/MM3 (ref 1–4.8)
MCH RBC QN AUTO: 29.2 PG (ref 27–31)
MCHC RBC AUTO-ENTMCNC: 33.4 GM/DL (ref 33–37)
MCV RBC AUTO: 87.4 FL (ref 81–99)
MONOCYTES # BLD: 9.6 %
MONOCYTES ABSOLUTE: 0.6 THOU/MM3 (ref 0.4–1.3)
NUCLEATED RED BLOOD CELLS: 0 /100 WBC
PDW BLD-RTO: 15.7 % (ref 11.5–14.5)
PLATELET # BLD: 137 THOU/MM3 (ref 130–400)
PMV BLD AUTO: 9.1 FL (ref 7.4–10.4)
POTASSIUM SERPL-SCNC: 3.4 MEQ/L (ref 3.5–5.2)
RBC # BLD: 3.91 MILL/MM3 (ref 4.2–5.4)
SEG NEUTROPHILS: 68 %
SEGMENTED NEUTROPHILS ABSOLUTE COUNT: 4.3 THOU/MM3 (ref 1.8–7.7)
SODIUM BLD-SCNC: 144 MEQ/L (ref 135–145)
TOTAL PROTEIN: 5 G/DL (ref 6.1–8)
URINE CULTURE, ROUTINE: NORMAL
WBC # BLD: 6.3 THOU/MM3 (ref 4.8–10.8)

## 2018-03-10 PROCEDURE — 99239 HOSP IP/OBS DSCHRG MGMT >30: CPT | Performed by: HOSPITALIST

## 2018-03-10 PROCEDURE — 6360000002 HC RX W HCPCS: Performed by: HOSPITALIST

## 2018-03-10 PROCEDURE — 85025 COMPLETE CBC W/AUTO DIFF WBC: CPT

## 2018-03-10 PROCEDURE — 2580000003 HC RX 258: Performed by: HOSPITALIST

## 2018-03-10 PROCEDURE — 6360000002 HC RX W HCPCS: Performed by: INTERNAL MEDICINE

## 2018-03-10 PROCEDURE — 36415 COLL VENOUS BLD VENIPUNCTURE: CPT

## 2018-03-10 PROCEDURE — 6370000000 HC RX 637 (ALT 250 FOR IP): Performed by: HOSPITALIST

## 2018-03-10 PROCEDURE — 2580000003 HC RX 258: Performed by: INTERNAL MEDICINE

## 2018-03-10 PROCEDURE — 80053 COMPREHEN METABOLIC PANEL: CPT

## 2018-03-10 PROCEDURE — 6370000000 HC RX 637 (ALT 250 FOR IP)

## 2018-03-10 PROCEDURE — 6370000000 HC RX 637 (ALT 250 FOR IP): Performed by: FAMILY MEDICINE

## 2018-03-10 RX ORDER — CIPROFLOXACIN 500 MG/1
500 TABLET, FILM COATED ORAL ONCE
Status: COMPLETED | OUTPATIENT
Start: 2018-03-10 | End: 2018-03-10

## 2018-03-10 RX ORDER — POTASSIUM CHLORIDE 750 MG/1
40 TABLET, FILM COATED, EXTENDED RELEASE ORAL ONCE
Status: COMPLETED | OUTPATIENT
Start: 2018-03-10 | End: 2018-03-10

## 2018-03-10 RX ORDER — CIPROFLOXACIN 500 MG/1
500 TABLET, FILM COATED ORAL 2 TIMES DAILY
Qty: 20 TABLET | Refills: 0 | Status: SHIPPED | OUTPATIENT
Start: 2018-03-10 | End: 2018-03-20

## 2018-03-10 RX ADMIN — PIPERACILLIN SODIUM,TAZOBACTAM SODIUM 3.38 G: 3; .375 INJECTION, POWDER, FOR SOLUTION INTRAVENOUS at 08:19

## 2018-03-10 RX ADMIN — CIPROFLOXACIN 500 MG: 500 TABLET, FILM COATED ORAL at 11:13

## 2018-03-10 RX ADMIN — POTASSIUM CHLORIDE 40 MEQ: 750 TABLET, FILM COATED, EXTENDED RELEASE ORAL at 09:01

## 2018-03-10 RX ADMIN — DEXTROSE AND SODIUM CHLORIDE: 5; 450 INJECTION, SOLUTION INTRAVENOUS at 08:18

## 2018-03-10 RX ADMIN — HEPARIN SODIUM 5000 UNITS: 5000 INJECTION, SOLUTION INTRAVENOUS; SUBCUTANEOUS at 08:27

## 2018-03-10 RX ADMIN — Medication 10 ML: at 08:24

## 2018-03-10 RX ADMIN — PIPERACILLIN SODIUM,TAZOBACTAM SODIUM 3.38 G: 3; .375 INJECTION, POWDER, FOR SOLUTION INTRAVENOUS at 00:13

## 2018-03-10 RX ADMIN — BUSPIRONE HYDROCHLORIDE 5 MG: 5 TABLET ORAL at 09:01

## 2018-03-10 NOTE — PROGRESS NOTES
Progress note: GI    Patient - Carroll Galeano,  Age - 80 y.o.    - 1922      Room Number - 3B-26/026-A   MRN -  [id number]   Acct # - [de-identified]  Date of Admission -  3/7/2018  5:26 PM    SUBJECTIVE:   Pain is better. No n,v.  OBJECTIVE   VITALS    weight is 123 lb 6.4 oz (56 kg). Her oral temperature is 98 °F (36.7 °C). Her blood pressure is 136/62 and her pulse is 99. Her respiration is 16 and oxygen saturation is 94%. Wt Readings from Last 3 Encounters:   18 123 lb 6.4 oz (56 kg)   12 122 lb (55.3 kg)   12 125 lb (56.7 kg)       I/O (24 Hours)    Intake/Output Summary (Last 24 hours) at 03/10/18 1028  Last data filed at 03/10/18 0824   Gross per 24 hour   Intake          2531.53 ml   Output              600 ml   Net          1931.53 ml       General Appearance  Awake, alert, confused  not  In acute distress  HEENT - normocephalic, atraumatic, pink conjunctiva,  anicteric sclera  Neck - Supple, no mass  Lungs -  Bilateral good air entry, no rhonchi, no wheeze  Cardiovascular - Heart sounds are normal.  Regular rate and rhythm without murmur, gallop or rub. Abdomen - soft, not distended, nontender, no organomegally,  Neurologic - Awake, alert, oriented to name, place and time. genaralised weakness  Skin - + bruising or bleeding  Extremities - No edema, no cyanosis, clubbing     MEDICATIONS:      ciprofloxacin  500 mg Oral Once    clonazePAM  0.25 mg Oral Nightly    potassium (CARDIAC) replacement protocol   Other RX Placeholder    heparin (porcine)  5,000 Units Subcutaneous BID    busPIRone  5 mg Oral TID    sodium chloride flush  10 mL Intravenous 2 times per day       sodium chloride flush, ondansetron      LABS:     CBC:   Recent Labs      18   0403  18   0852  03/10/18   0623   WBC  6.9  6.0  6.3   HGB  10.4*  11.4*  11.4*   PLT  111*  126*  137     BMP:  Recent Labs      18   0403  18   0449  03/10/18   0623   NA  141  141  144   K  3.6 HTN (hypertension)    Hyperlipidemia    Cerebral vascular disease    Anxiety    Depression    GERD (gastroesophageal reflux disease)    S/P colonoscopy with polypectomy    Acute encephalopathy    Transaminitis    Dehydration    Syncope and collapse       Elevated liver enzymes- improving likely hypoxic liver injury  Dysphagia- swallow studies completed   p- continue dysphagia 11 diet  Aspiration precautions  Ok transfer back to NH.     Moise Bowers MD, [address] 3/10/2018 10:29 AM

## 2018-03-10 NOTE — DISCHARGE SUMMARY
Discharge Summary    Patient:  Lyudmila Ren  YOB: 1922    MRN: [id number]   Acct: [de-identified]    Primary Care Physician: Loren January date:  3/7/2018    Discharge date:   3/10/2018       Discharge Diagnoses:   Acute encephalopathy  Principal Problem:    Acute encephalopathy  Active Problems:    HTN (hypertension)    Transaminitis    Dehydration    Syncope and collapse  Resolved Problems:    * No resolved hospital problems. *        Admitted for: (HPI)  80 y.o. female who [facility] [address] [place] [address] [facility]. The patient [facility] to the [facility] after she was found with her face down in the floor at the assisted living with un diane down time. and since then,she remained less verbal,confused and aphasic. Head Ct and abd Cts were normal;her LFTs were abn--obs like process. No fever,n/v/d or sob,leg swelling ,headache,neck pain or back pain was reported,  The patient was treated empirically with iv fluids and antibiotics. Imaging studies were normal.       Hospital Course:  80year old admitted with acute metabolic encephalopathy secondary to acute respiratory failure, found LOC of assisted living floor with unknown downtime. Patient was sent from [facility]. She had positive blood cultures that grew E coli, suspect Urinary source. Urine cultures obtained after antibiotics started and are negative to date. She was seen by Neurology for AMS and have negative Neuro imaging and EEG negative for seizures. Her mentation did improve with antibiotic therapy. She had elevated LFTs thought to be due to shock liver and were trending downwards prior to discharge. GI recommended no further work up. She had normal ammonia levels and U/S GB was unremarkable. She is being dc to ECF for continued weakness and debility. Patient had ofloxacin allergy listed as anxiety, trial PO Cipro was given with no issues prior to dc.     Consultants:  Patient Care Team:  Ivelisse Syed as PCP - General (Family Medicine)    Discharge Medications:     Medication List      START taking these medications    ciprofloxacin 500 MG tablet  Commonly known as:  CIPRO  Take 1 tablet by mouth 2 times daily for 10 days        ASK your doctor about these medications    acetaminophen 325 MG tablet  Commonly known as:  TYLENOL     amitriptyline 10 MG tablet  Commonly known as:  ELAVIL     aspirin 81 MG EC tablet     clonazePAM 0.5 MG tablet  Commonly known as:  KLONOPIN     clotrimazole-betamethasone 1-0.05 % cream  Commonly known as:  LOTRISONE     cyclobenzaprine 10 MG tablet  Commonly known as:  FLEXERIL     diltiazem 180 MG ER capsule  Commonly known as:  DILACOR XR     docusate sodium 100 MG capsule  Commonly known as:  COLACE     fexofenadine 60 MG tablet  Commonly known as:  ALLEGRA     fluticasone 50 MCG/ACT nasal spray  Commonly known as:  FLONASE     furosemide 20 MG tablet  Commonly known as:  LASIX     GLUCOSAMINE 1500 COMPLEX Caps     HYDROcodone-acetaminophen 5-325 MG per tablet  Commonly known as:  NORCO     hydrocortisone 2.5 % rectal cream  Commonly known as:  ANUSOL-HC     omeprazole 20 MG delayed release capsule  Commonly known as:  PRILOSEC     ranitidine 150 MG tablet  Commonly known as:  ZANTAC     simvastatin 20 MG tablet  Commonly known as:  ZOCOR     therapeutic multivitamin-minerals tablet           Where to Get Your Medications      These medications were sent to [address] [place] [address] [facility] [phone]  [address] [place] [address]    Phone:  [phone]   · ciprofloxacin 500 MG tablet           Physical Exam:    Vitals:  Vitals:    03/09/18 1515 03/09/18 2015 03/10/18 0445 03/10/18 0811   BP: (!) 132/59 (!) 125/57 (!) 122/57 136/62   Pulse: 83 80 98 99   Resp: 16 16 20 16   Temp: 98 °F (36.7 °C) 98.4 °F (36.9 °C) 97.8 °F (36.6 °C) 98 °F (36.7 °C)   TempSrc: Oral Oral Oral Oral   SpO2: 96% 96% 96% 94%   Weight:         Weight: Weight: 123 lb 6.4 oz Eosinophils # 0.3 0.0 - 0.4 thou/mm3    Basophils # 0.0 0.0 - 0.1 thou/mm3   Anion Gap   Result Value Ref Range    Anion Gap 12.0 8.0 - 16.0 meq/L   Glomerular Filtration Rate, Estimated   Result Value Ref Range    Est, Glom Filt Rate >90 ml/min/1.73m2   Comprehensive metabolic panel   Result Value Ref Range    Glucose 137 (H) 70 - 108 mg/dL    CREATININE 0.6 0.4 - 1.2 mg/dL    BUN 9 7 - 22 mg/dL    Sodium 144 135 - 145 meq/L    Potassium 3.4 (L) 3.5 - 5.2 meq/L    Chloride 112 (H) 98 - 111 meq/L    CO2 19 (L) 23 - 33 meq/L    Calcium 8.6 8.5 - 10.5 mg/dL    AST 55 (H) 5 - 40 U/L    Alkaline Phosphatase 175 (H) 38 - 126 U/L    Total Protein 5.0 (L) 6.1 - 8.0 g/dL    Alb 2.8 (L) 3.5 - 5.1 g/dL    Total Bilirubin 1.0 0.3 - 1.2 mg/dL     (H) 11 - 66 U/L   CBC auto differential   Result Value Ref Range    WBC 6.3 4.8 - 10.8 thou/mm3    RBC 3.91 (L) 4.20 - 5.40 mill/mm3    Hemoglobin 11.4 (L) 12.0 - 16.0 gm/dl    Hematocrit 34.2 (L) 37.0 - 47.0 %    MCV 87.4 81.0 - 99.0 fL    MCH 29.2 27.0 - 31.0 pg    MCHC 33.4 33.0 - 37.0 gm/dl    RDW 15.7 (H) 11.5 - 14.5 %    Platelets 264 395 - 799 thou/mm3    MPV 9.1 7.4 - 10.4 fL    Seg Neutrophils 68.0 %    Lymphocytes 18.0 %    Monocytes 9.6 %    Eosinophils 4.0 %    Basophils 0.4 %    nRBC 0 /100 wbc    Anisocytosis 1+ Absent    Segs Absolute 4.3 1.8 - 7.7 thou/mm3    Lymphocytes # 1.1 1.0 - 4.8 thou/mm3    Monocytes # 0.6 0.4 - 1.3 thou/mm3    Eosinophils # 0.3 0.0 - 0.4 thou/mm3    Basophils # 0.0 0.0 - 0.1 thou/mm3   Anion Gap   Result Value Ref Range    Anion Gap 13.0 8.0 - 16.0 meq/L   Glomerular Filtration Rate, Estimated   Result Value Ref Range    Est, Glom Filt Rate >90 ml/min/1.73m2       Diet:  DIET DYSPHAGIA II MECHANICALLY ALTERED;     Activity:  Activity as tolerated (Patient may move about with assist as indicated or with supervision.)    Follow-up:  in the next few days with Griselda Morrow    Disposition: SNF    Condition: Stable      Time Spent: 35 minutes    Electronically signed by Simi Fish MD on 3/10/2018 at 10:12 AM    Discharging Hospitalist

## 2018-03-10 NOTE — PLAN OF CARE
Problem: Falls - Risk of  Goal: Absence of falls  Outcome: Ongoing  Patient did not fall this shift      Problem: DISCHARGE BARRIERS  Goal: Patient's continuum of care needs are met  Outcome: Ongoing  Patient will discharge to [facility] for rehab    Problem: Risk for Impaired Skin Integrity  Goal: Tissue integrity - skin and mucous membranes  Structural intactness and normal physiological function of skin and  mucous membranes. Outcome: Ongoing  No new skin issues noted      Comments: Care plan reviewed with patient and daughter. Patient and daughter verbalize understanding of the plan of care and contribute to goal setting.

## 2018-03-10 NOTE — PROGRESS NOTES
S This staff stopped to check in on the patient. Family was sitting by her side when this staff came in to her room. Patient was sitting up in her bed and holding her daughters hand. O: Patient visibly looked like she was feeling better. Patient's voice also sounded much better. A: Patient was asked if there was anything more we can do for her. She assured me if there is she will be sure to let ms know. We started to talk about her concerns with being in the hospital. This staff pointed out to her how much better her conversation is today. Patient was unaware that she was able to converse. Daughter then acknowledged what this staff pointed out. P: Patient would appreciated the continued support of our staff. Just dropping by as a presence or her appears to be helpful enough.

## 2018-03-13 LAB
BLOOD CULTURE, ROUTINE: NORMAL
BLOOD CULTURE, ROUTINE: NORMAL

## 2018-04-11 PROBLEM — E86.0 DEHYDRATION: Status: RESOLVED | Noted: 2018-03-07 | Resolved: 2018-04-11

## 2023-03-03 NOTE — PROGRESS NOTES
3.9  3.6  3.4*   CL  107  107  107   CO2  21*  22*  22*   BUN  21  22  14   CREATININE  0.7  0.7  0.6   GLUCOSE  98  134*  101     EEG. IMPRESSION:  This is an abnormal EEG due to the excessive slowing seen in  the theta range, suggestive of cortical dysfunction such as seen with  encephalopathies. However, there was no evidence of epileptiform activity  appreciated. MRI 3/8/2018     Impression       1. No evidence of an acute infarct. 2. Moderate severity chronic small vessel ischemic changes. 3. 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 Assessment:  Principal Problem:    Acute encephalopathy  Active Problems:    HTN (hypertension)    Transaminitis    Dehydration    Syncope and collapse  Resolved Problems:    * No resolved hospital problems. *  she is alert, pleasant anxious. She is cooperative. She is able to follow commands. EEG shows metabolic picture    Plan:    1. Will follow as needed. 2. Please call if any questions.      Olu Ruggiero MD, 3/9/2018 6:59 PM Propranolol Pregnancy And Lactation Text: This medication is Pregnancy Category C and it isn't known if it is safe during pregnancy. It is excreted in breast milk.